# Patient Record
Sex: MALE | Race: OTHER | HISPANIC OR LATINO | URBAN - METROPOLITAN AREA
[De-identification: names, ages, dates, MRNs, and addresses within clinical notes are randomized per-mention and may not be internally consistent; named-entity substitution may affect disease eponyms.]

---

## 2023-06-08 ENCOUNTER — EMERGENCY (EMERGENCY)
Facility: HOSPITAL | Age: 38
LOS: 1 days | Discharge: HOME | End: 2023-06-08
Attending: EMERGENCY MEDICINE
Payer: COMMERCIAL

## 2023-06-08 VITALS
RESPIRATION RATE: 16 BRPM | HEART RATE: 73 BPM | WEIGHT: 279.99 LBS | TEMPERATURE: 98 F | DIASTOLIC BLOOD PRESSURE: 78 MMHG | HEIGHT: 69 IN | OXYGEN SATURATION: 96 % | SYSTOLIC BLOOD PRESSURE: 121 MMHG

## 2023-06-08 LAB
APPEARANCE UR: CLEAR — SIGNIFICANT CHANGE UP
BACTERIA # UR AUTO: NEGATIVE — SIGNIFICANT CHANGE UP
BILIRUB UR-MCNC: NEGATIVE — SIGNIFICANT CHANGE UP
COLOR SPEC: COLORLESS — SIGNIFICANT CHANGE UP
DIFF PNL FLD: NEGATIVE — SIGNIFICANT CHANGE UP
EPI CELLS # UR: 1 /HPF — SIGNIFICANT CHANGE UP
GLUCOSE UR QL: NEGATIVE — SIGNIFICANT CHANGE UP
KETONES UR-MCNC: NEGATIVE — SIGNIFICANT CHANGE UP
LEUKOCYTE ESTERASE UR-ACNC: NEGATIVE — SIGNIFICANT CHANGE UP
NITRITE UR-MCNC: NEGATIVE — SIGNIFICANT CHANGE UP
PH UR: 6 — SIGNIFICANT CHANGE UP (ref 5–8)
PROT UR-MCNC: NEGATIVE — SIGNIFICANT CHANGE UP
RBC CASTS # UR COMP ASSIST: 1 /HPF — SIGNIFICANT CHANGE UP (ref 0–4)
SP GR SPEC: 1.01 — SIGNIFICANT CHANGE UP (ref 1.01–1.02)
UROBILINOGEN FLD QL: NEGATIVE — SIGNIFICANT CHANGE UP
WBC UR QL: 1 /HPF — SIGNIFICANT CHANGE UP (ref 0–5)

## 2023-06-08 PROCEDURE — 99285 EMERGENCY DEPT VISIT HI MDM: CPT | Mod: 25

## 2023-06-08 PROCEDURE — 81001 URINALYSIS AUTO W/SCOPE: CPT

## 2023-06-08 PROCEDURE — 93975 VASCULAR STUDY: CPT | Mod: 26

## 2023-06-08 PROCEDURE — 93975 VASCULAR STUDY: CPT

## 2023-06-08 PROCEDURE — 76870 US EXAM SCROTUM: CPT | Mod: 26

## 2023-06-08 PROCEDURE — 99284 EMERGENCY DEPT VISIT MOD MDM: CPT

## 2023-06-08 PROCEDURE — 87086 URINE CULTURE/COLONY COUNT: CPT

## 2023-06-08 PROCEDURE — 76870 US EXAM SCROTUM: CPT

## 2023-06-08 RX ORDER — ACETAMINOPHEN 500 MG
975 TABLET ORAL ONCE
Refills: 0 | Status: COMPLETED | OUTPATIENT
Start: 2023-06-08 | End: 2023-06-08

## 2023-06-08 RX ORDER — IBUPROFEN 200 MG
600 TABLET ORAL ONCE
Refills: 0 | Status: COMPLETED | OUTPATIENT
Start: 2023-06-08 | End: 2023-06-08

## 2023-06-08 RX ADMIN — Medication 600 MILLIGRAM(S): at 22:42

## 2023-06-08 RX ADMIN — Medication 975 MILLIGRAM(S): at 22:42

## 2023-06-08 NOTE — ED ADULT NURSE NOTE - NSFALLUNIVINTERV_ED_ALL_ED
Bed/Stretcher in lowest position, wheels locked, appropriate side rails in place/Call bell, personal items and telephone in reach/Instruct patient to call for assistance before getting out of bed/chair/stretcher/Non-slip footwear applied when patient is off stretcher/[place] to call system/Physically safe environment - no spills, clutter or unnecessary equipment/Purposeful proactive rounding/Room/bathroom lighting operational, light cord in reach

## 2023-06-08 NOTE — ED PROVIDER NOTE - NS ED ATTENDING STATEMENT MOD
This was a shared visit with the MANUELA. I reviewed and verified the documentation and independently performed the documented:

## 2023-06-08 NOTE — ED PROVIDER NOTE - CLINICAL SUMMARY MEDICAL DECISION MAKING FREE TEXT BOX
Attending note.  See differential above.  Repeat ultrasound and urinalysis.  Lab work and imaging studies from [facility] ER were reviewed in no apparent etiology for patient's complaints.  G0.

## 2023-06-08 NOTE — ED PROVIDER NOTE - PHYSICAL EXAMINATION
Attending note.  Patient is alert and in moderate to severe distress and reports pain is 10/10.  Abdomen is obese, soft, nontender without guarding or rebound.  There is no evidence of hernia.  There is no CVA tenderness.  Examination of the genitalia.  Patient is uncircumcised.  There is no scrotal swelling or edema.  Testicles do not appear to be swollen.  Right testicle is exquisitely tender in the entire testicle and posteriorly.  There is no hernia.  There are no lesions.

## 2023-06-08 NOTE — ED PROVIDER NOTE - OBJECTIVE STATEMENT
Attending note.  Patient was seen in room #34 to the right.  Patient complaining of severe right testicular pain which started at 9 AM this morning.  He denies any trauma or injury.  Patient was working in [employer] and went to [facility] where CBC, chemistry, urinalysis, scrotal ultrasound was performed and reported to be negative.  Patient received morphine and Toradol with temporary improvement.  He denies any abdominal pain, nausea, vomiting, flank pain, back pain, fevers, chills, hematuria or prior episodes.  He reports no testicular swelling.  STD screen was negative.

## 2023-06-08 NOTE — ED PROVIDER NOTE - DIFFERENTIAL DIAGNOSIS
Differential Diagnosis Differential severe right testicular pain not limited to orchitis/epididymitis/varicocele/hydrocele/torsion/renal colic/hernia

## 2023-06-08 NOTE — ED ADULT TRIAGE NOTE - CHIEF COMPLAINT QUOTE
patient c/o right testicular pain since 9 am this morning, seen and d/c from [facility] with negative findings

## 2023-06-08 NOTE — ED ADULT NURSE NOTE - OBJECTIVE STATEMENT
38 y/o M presents to the ED with complaints of R testicular pain. Patient states he was at work when sudden onset of R pain occurred at approx. 0900 this morning. Pain described as sharp, 9/10 in severity and worsened to 10/10 with movement. Pain is non-radiating. Denies trauma or injury. Notes pain improved with Toradol and Morphine at prior facility for similar. Denies fever, chills, abdominal pain, nausea, scrotal swelling. AOx4 and speaking coherently. Breathing is unlabored, spontaneous, and symmetrical. Abdomen is soft, nondistended, and nontender. No peripheral edema noted. <2s capillary refill. Ambulatory with full ROM of all extremities.

## 2023-06-08 NOTE — ED PROVIDER NOTE - NSFOLLOWUPINSTRUCTIONS_ED_ALL_ED_FT
Motrin (Ibuprofen) 600 mg every 6 hours or 800 mg every 8 hours.  Wear scrotal supporter.  Follow-up with urology if not improved in the next 48 hours.  .

## 2023-06-08 NOTE — ED PROVIDER NOTE - PATIENT PORTAL LINK FT
You can access the FollowMyHealth Patient Portal offered by [facility] by registering at the following website: [email or web address]. By joining [facility]’s FollowMyHealth portal, you will also be able to view your health information using other applications (apps) compatible with our system.

## 2023-06-08 NOTE — ED ADULT NURSE NOTE - CHIEF COMPLAINT QUOTE
patient c/o right testicular pain since 9 am this morning, seen and d/c from [facility] with negative findings

## 2023-06-09 VITALS
SYSTOLIC BLOOD PRESSURE: 142 MMHG | HEART RATE: 58 BPM | OXYGEN SATURATION: 96 % | TEMPERATURE: 98 F | DIASTOLIC BLOOD PRESSURE: 87 MMHG | RESPIRATION RATE: 18 BRPM

## 2023-06-10 LAB
CULTURE RESULTS: SIGNIFICANT CHANGE UP
SPECIMEN SOURCE: SIGNIFICANT CHANGE UP

## 2023-06-12 ENCOUNTER — APPOINTMENT (OUTPATIENT)
Dept: UROLOGY | Facility: CLINIC | Age: 38
End: 2023-06-12
Payer: COMMERCIAL

## 2023-06-12 ENCOUNTER — TRANSCRIPTION ENCOUNTER (OUTPATIENT)
Age: 38
End: 2023-06-12

## 2023-06-12 VITALS — BODY MASS INDEX: 41.47 KG/M2 | OXYGEN SATURATION: 96 % | WEIGHT: 280 LBS | HEART RATE: 83 BPM | HEIGHT: 69 IN

## 2023-06-12 DIAGNOSIS — N45.3 EPIDIDYMO-ORCHITIS: ICD-10-CM

## 2023-06-12 PROBLEM — Z00.00 ENCOUNTER FOR PREVENTIVE HEALTH EXAMINATION: Status: ACTIVE | Noted: 2023-06-12

## 2023-06-12 PROCEDURE — 99204 OFFICE O/P NEW MOD 45 MIN: CPT

## 2023-06-12 RX ORDER — CIPROFLOXACIN HYDROCHLORIDE 500 MG/1
500 TABLET, FILM COATED ORAL
Qty: 42 | Refills: 0 | Status: ACTIVE | COMMUNITY
Start: 2023-06-12 | End: 1900-01-01

## 2023-06-12 RX ORDER — CEFTRIAXONE 1 G/1
1 INJECTION, POWDER, FOR SOLUTION INTRAMUSCULAR; INTRAVENOUS
Qty: 1 | Refills: 0 | Status: COMPLETED | OUTPATIENT
Start: 2023-06-12

## 2023-06-12 RX ORDER — CEFTRIAXONE 1 G/1
1 INJECTION, POWDER, FOR SOLUTION INTRAMUSCULAR; INTRAVENOUS
Qty: 1 | Refills: 0 | Status: ACTIVE | COMMUNITY
Start: 2023-06-12

## 2023-06-12 RX ADMIN — CEFTRIAXONE SODIUM 0 GM: 1 INJECTION, POWDER, FOR SOLUTION INTRAMUSCULAR; INTRAVENOUS at 00:00

## 2023-06-12 NOTE — ASSESSMENT
[FreeTextEntry1] : Mr. DOWELL is a 37 year  Other race  M who comes today to clinic for right testicular pain since 2 days ago. \par \par We discussed the the different possible presentations of UTIs in males including but not limited to urethritis, prostatitis, orchitis, balanitis.\par \par Orchiepididymitis\par Acute epididymitis is the most common cause of scrotal pain in adults featured by testicular pain, swelling, and tenderness (epididymo-orchitis). It is most commonly infectious in etiology but can also be from noninfectious causes such as trauma and autoimmune diseases. Escherichia coli, other coliforms, and Pseudomonas species are more frequent in older men, often in association with obstructive uropathy from benign prostatic hyperplasia. Other less common organisms responsible for acute epididymitis include Ureaplasma species, Mycoplasma genitalium. \par

## 2023-06-12 NOTE — PHYSICAL EXAM
[General Appearance - Well Developed] : well developed [General Appearance - Well Nourished] : well nourished [Normal Appearance] : normal appearance [Well Groomed] : well groomed [General Appearance - In No Acute Distress] : no acute distress [Edema] : no peripheral edema [Respiration, Rhythm And Depth] : normal respiratory rhythm and effort [Exaggerated Use Of Accessory Muscles For Inspiration] : no accessory muscle use [Abdomen Soft] : soft [Abdomen Tenderness] : non-tender [Costovertebral Angle Tenderness] : no ~M costovertebral angle tenderness [Urethral Meatus] : meatus normal [Urinary Bladder Findings] : the bladder was normal on palpation [Scrotum] : the scrotum was normal [Testes Mass (___cm)] : there were no testicular masses [FreeTextEntry1] : right enlarged testicle, ttp. left testis nl [Normal Station and Gait] : the gait and station were normal for the patient's age [] : no rash [No Focal Deficits] : no focal deficits [Oriented To Time, Place, And Person] : oriented to person, place, and time [Affect] : the affect was normal [Mood] : the mood was normal [Not Anxious] : not anxious [No Palpable Adenopathy] : no palpable adenopathy

## 2023-06-12 NOTE — HISTORY OF PRESENT ILLNESS
[10] : 10 [FreeTextEntry1] : Mr. DOWELL is a 37 year  Other race  M who comes today to clinic for right testicular pain since 2 days ago. Went to [facility] ER. US: normal. . No rectal intercourse.\par Denies LUTS, fevers, chills, hematuria, flank pain. He has never smoked. \par No family history of Prostate cancer.

## 2023-06-13 LAB
APPEARANCE: ABNORMAL
BACTERIA: NEGATIVE /HPF
BILIRUBIN URINE: NEGATIVE
BLOOD URINE: NEGATIVE
C TRACH RRNA SPEC QL NAA+PROBE: NOT DETECTED
CAST: 0 /LPF
COLOR: YELLOW
EPITHELIAL CELLS: 2 /HPF
GLUCOSE QUALITATIVE U: NEGATIVE MG/DL
KETONES URINE: NEGATIVE MG/DL
LEUKOCYTE ESTERASE URINE: NEGATIVE
MICROSCOPIC-UA: NORMAL
N GONORRHOEA RRNA SPEC QL NAA+PROBE: NOT DETECTED
NITRITE URINE: NEGATIVE
PH URINE: 5.5
PROTEIN URINE: NEGATIVE MG/DL
RED BLOOD CELLS URINE: 0 /HPF
SOURCE AMPLIFICATION: NORMAL
SPECIFIC GRAVITY URINE: 1.02
UROBILINOGEN URINE: 0.2 MG/DL
WHITE BLOOD CELLS URINE: 1 /HPF

## 2023-06-16 LAB — BACTERIA UR CULT: NORMAL

## 2023-06-21 LAB
MYCOPLASMA HOMINIS CULTURE: NEGATIVE
UREAPLASMA CULTURE: POSITIVE

## 2023-07-12 PROBLEM — R73.03 PREDIABETES: Chronic | Status: ACTIVE | Noted: 2023-06-08

## 2023-07-14 ENCOUNTER — APPOINTMENT (OUTPATIENT)
Dept: UROLOGY | Facility: CLINIC | Age: 38
End: 2023-07-14
Payer: COMMERCIAL

## 2023-07-14 VITALS
OXYGEN SATURATION: 98 % | BODY MASS INDEX: 41.47 KG/M2 | HEART RATE: 89 BPM | WEIGHT: 280 LBS | HEIGHT: 69 IN | DIASTOLIC BLOOD PRESSURE: 84 MMHG | SYSTOLIC BLOOD PRESSURE: 122 MMHG

## 2023-07-14 DIAGNOSIS — N45.1 EPIDIDYMITIS: ICD-10-CM

## 2023-07-14 PROCEDURE — 99213 OFFICE O/P EST LOW 20 MIN: CPT

## 2023-07-14 NOTE — HISTORY OF PRESENT ILLNESS
[FreeTextEntry1] : Mr. DOWELL is a 37 year  Other race  M who comes today to clinic for right testicular pain since 2 days ago. Went to [facility] ER. US: normal. . No rectal intercourse.\par Denies LUTS, fevers, chills, hematuria, flank pain. He has never smoked. \par No family history of Prostate cancer.\par  \par \par 7/14/23\par fu\par pain dramatically improved. \par UF not obstructive\par Denies LUTS, fevers, chills, hematuria, flank pain. He has never smoked.  [10] : 10

## 2023-07-14 NOTE — ASSESSMENT
[FreeTextEntry1] : Mr. DOWELL is a 37 year  Other race  M with orchitis. pain dramatically improved after antibiotic treatment. \par \par We discussed the the different possible presentations of UTIs in males including but not limited to urethritis, prostatitis, orchitis, balanitis.\par \par Orchiepididymitis\par Acute epididymitis is the most common cause of scrotal pain in adults featured by testicular pain, swelling, and tenderness (epididymo-orchitis). It is most commonly infectious in etiology but can also be from noninfectious causes such as trauma and autoimmune diseases. Escherichia coli, other coliforms, and Pseudomonas species are more frequent in older men, often in association with obstructive uropathy from benign prostatic hyperplasia. Other less common organisms responsible for acute epididymitis include Ureaplasma species, Mycoplasma genitalium. \par

## 2023-07-14 NOTE — PHYSICAL EXAM
[General Appearance - Well Developed] : well developed [General Appearance - Well Nourished] : well nourished [Normal Appearance] : normal appearance [Well Groomed] : well groomed [General Appearance - In No Acute Distress] : no acute distress [Abdomen Soft] : soft [Abdomen Tenderness] : non-tender [Costovertebral Angle Tenderness] : no ~M costovertebral angle tenderness [Urethral Meatus] : meatus normal [Urinary Bladder Findings] : the bladder was normal on palpation [Scrotum] : the scrotum was normal [Testes Mass (___cm)] : there were no testicular masses [FreeTextEntry1] : normal testes bilat, nttp [Edema] : no peripheral edema [] : no respiratory distress [Respiration, Rhythm And Depth] : normal respiratory rhythm and effort [Exaggerated Use Of Accessory Muscles For Inspiration] : no accessory muscle use [Oriented To Time, Place, And Person] : oriented to person, place, and time [Affect] : the affect was normal [Mood] : the mood was normal [Not Anxious] : not anxious [Normal Station and Gait] : the gait and station were normal for the patient's age [No Focal Deficits] : no focal deficits [No Palpable Adenopathy] : no palpable adenopathy

## 2024-08-07 NOTE — ED PROVIDER NOTE - NSFOLLOWUPCLINICS_GEN_ALL_ED_FT
I have patient med list that he is taking Xyzal 5 mg nightly.  Please verify if he is taking this and if he is how long has he been on this dose?   [facility] [place] [facility]  [facility]  [address] [place] [address]  Phone: [phone]  Fax:   Follow Up Time: 1-3 Days